# Patient Record
Sex: FEMALE | Race: WHITE
[De-identification: names, ages, dates, MRNs, and addresses within clinical notes are randomized per-mention and may not be internally consistent; named-entity substitution may affect disease eponyms.]

---

## 2021-08-13 ENCOUNTER — HOSPITAL ENCOUNTER (OUTPATIENT)
Dept: HOSPITAL 47 - RADMAMWWP | Age: 61
Discharge: HOME | End: 2021-08-13
Attending: FAMILY MEDICINE
Payer: COMMERCIAL

## 2021-08-13 DIAGNOSIS — R92.2: Primary | ICD-10-CM

## 2021-08-13 DIAGNOSIS — Z80.3: ICD-10-CM

## 2021-08-13 DIAGNOSIS — Z78.0: ICD-10-CM

## 2021-08-13 PROCEDURE — 77065 DX MAMMO INCL CAD UNI: CPT

## 2021-08-13 NOTE — USB
History:

Patient is postmenopausal and is nulliparous.

Family history of breast cancer in mother at age 65.



US Breast RT

Technologist: Rhonda Hendrickson

Right complete breast ultrasound includes all four quadrants, the retroareolar 

region and axilla. Finding demonstrates no cystic or solid lesion seen.



These results were verbally communicated with the patient and result sheet given 

to the patient on 8/13/21.





ASSESSMENT: Benign, BI-RAD 2



RECOMMENDATION:

Return to routine screening mammogram schedule for both breasts.

## 2021-08-13 NOTE — MM
Reason for exam: follow-up at short interval from prior study.

Last mammogram was performed 8 months ago.



History:

Patient is postmenopausal and is nulliparous.

Family history of breast cancer in mother at age 65.



Physical Findings:

Nurse did not find any significant physical abnormalities on exam.



[facility] Diagnostic Mammo RT w CAD

CC and MLO view(s) were taken of the right breast.

Prior study comparison: December 3, 2020, bilateral [facility] 3d screening mammo w/cad.

The breast tissue is heterogeneously dense. This may lower the sensitivity of 

mammography.  There is no discrete abnormality including area of concern right 

upper outer quadrant, changing, somewhat nodular.

This finding is changed when compared with previous exams.



These results were verbally communicated with the patient and result sheet given 

to the patient on 8/13/21.





ASSESSMENT: Probably benign, BI-RAD 3



RECOMMENDATION:

Follow-up diagnostic mammogram of the right breast in 6 months.